# Patient Record
Sex: FEMALE
[De-identification: names, ages, dates, MRNs, and addresses within clinical notes are randomized per-mention and may not be internally consistent; named-entity substitution may affect disease eponyms.]

---

## 2022-08-25 ENCOUNTER — P2P PATIENT RECORD (OUTPATIENT)
Age: 67
End: 2022-08-25

## 2023-06-16 ENCOUNTER — OFFICE VISIT (OUTPATIENT)
Dept: URBAN - METROPOLITAN AREA TELEHEALTH 2 | Facility: TELEHEALTH | Age: 68
End: 2023-06-16
Payer: MEDICARE

## 2023-06-16 ENCOUNTER — LAB OUTSIDE AN ENCOUNTER (OUTPATIENT)
Dept: URBAN - METROPOLITAN AREA TELEHEALTH 2 | Facility: TELEHEALTH | Age: 68
End: 2023-06-16

## 2023-06-16 DIAGNOSIS — Z83.71 FAMILY HISTORY OF COLONIC POLYPS: ICD-10-CM

## 2023-06-16 PROBLEM — 34713006: Status: ACTIVE | Noted: 2023-06-16

## 2023-06-16 PROCEDURE — 996 AG2 (NON BILLABLE): Performed by: INTERNAL MEDICINE

## 2023-06-16 NOTE — HPI-TODAY'S VISIT:
The patient has a family history of colon polyps who presents for a screening colon. The pt has complaints of increased gas and bloating and abdominal discomfort. She had a colon 10 yrs ago and was negative for polyps. She will have oatmeal with soymilk and boiled eggs, and bagel with firied eggs. For lunch pt will have a bowle of fruit and for dinner she will have pasta with shrimp and salmon with spinach  She is 5'5" 150lbs. The pt notes that she is regular and she denies melena, hematemesis or hematochezia. She notes that she has had intermittent sleeping difficulites and she denies snoring.   The patient's consultation note will be sent to the referring MD, Dr. Arpita Hou.

## 2023-08-29 ENCOUNTER — CLAIMS CREATED FROM THE CLAIM WINDOW (OUTPATIENT)
Dept: URBAN - METROPOLITAN AREA CLINIC 4 | Facility: CLINIC | Age: 68
End: 2023-08-29
Payer: MEDICARE

## 2023-08-29 ENCOUNTER — OFFICE VISIT (OUTPATIENT)
Dept: URBAN - METROPOLITAN AREA SURGERY CENTER 16 | Facility: SURGERY CENTER | Age: 68
End: 2023-08-29
Payer: MEDICARE

## 2023-08-29 DIAGNOSIS — K62.1 ANAL AND RECTAL POLYP: ICD-10-CM

## 2023-08-29 DIAGNOSIS — Z12.11 COLON CANCER SCREENING: ICD-10-CM

## 2023-08-29 DIAGNOSIS — K63.89 OTHER SPECIFIED DISEASES OF INTESTINE: ICD-10-CM

## 2023-08-29 PROCEDURE — 45380 COLONOSCOPY AND BIOPSY: CPT | Performed by: INTERNAL MEDICINE

## 2023-08-29 PROCEDURE — G8907 PT DOC NO EVENTS ON DISCHARG: HCPCS | Performed by: INTERNAL MEDICINE

## 2023-08-29 PROCEDURE — 88305 TISSUE EXAM BY PATHOLOGIST: CPT | Performed by: PATHOLOGY

## 2024-01-08 ENCOUNTER — OFFICE VISIT (OUTPATIENT)
Dept: URBAN - METROPOLITAN AREA CLINIC 17 | Facility: CLINIC | Age: 69
End: 2024-01-08

## 2024-01-29 ENCOUNTER — DASHBOARD ENCOUNTERS (OUTPATIENT)
Age: 69
End: 2024-01-29

## 2024-02-02 ENCOUNTER — TELEP (OUTPATIENT)
Dept: URBAN - METROPOLITAN AREA TELEHEALTH 2 | Facility: TELEHEALTH | Age: 69
End: 2024-02-02
Payer: MEDICARE

## 2024-02-02 DIAGNOSIS — E55.9 VITAMIN D DEFICIENCY: ICD-10-CM

## 2024-02-02 DIAGNOSIS — R19.4 CHANGE IN BOWEL HABITS: ICD-10-CM

## 2024-02-02 DIAGNOSIS — Z86.010 PERSONAL HISTORY OF COLONIC POLYPS: ICD-10-CM

## 2024-02-02 PROBLEM — 428283002: Status: ACTIVE | Noted: 2024-02-02

## 2024-02-02 PROBLEM — 3972004: Status: ACTIVE | Noted: 2024-02-02

## 2024-02-02 PROCEDURE — 99214 OFFICE O/P EST MOD 30 MIN: CPT | Performed by: INTERNAL MEDICINE

## 2024-02-02 RX ORDER — AMITRIPTYLINE HYDROCHLORIDE 25 MG/1
1 TABLET AT BEDTIME TABLET, FILM COATED ORAL ONCE A DAY
Qty: 90 TABLET | Refills: 3 | OUTPATIENT
Start: 2024-02-02

## 2024-02-02 NOTE — HPI-TODAY'S VISIT:
The patiant has a history of diverticular disease of the colon and benign colon polyps who presents for f/u office viist. The pt is s/p colon 8/2023 + tics and HP. Notes that she is regular and she denies melena, hematemesis or hematochezia. The pt notes that she does not sleep well due to hormonal changes and insomnia.   The patient's time of visit DOS is 35 minutes after review of the old records.